# Patient Record
Sex: FEMALE | Race: OTHER | Employment: PART TIME | URBAN - METROPOLITAN AREA
[De-identification: names, ages, dates, MRNs, and addresses within clinical notes are randomized per-mention and may not be internally consistent; named-entity substitution may affect disease eponyms.]

---

## 2022-10-24 ENCOUNTER — HOSPITAL ENCOUNTER (EMERGENCY)
Dept: ULTRASOUND IMAGING | Age: 27
Discharge: HOME | End: 2022-10-27
Payer: COMMERCIAL

## 2022-10-24 ENCOUNTER — HOSPITAL ENCOUNTER (EMERGENCY)
Age: 27
Discharge: HOME | End: 2022-10-24
Attending: EMERGENCY MEDICINE
Payer: COMMERCIAL

## 2022-10-24 VITALS
HEART RATE: 114 BPM | OXYGEN SATURATION: 97 % | TEMPERATURE: 98.5 F | RESPIRATION RATE: 18 BRPM | DIASTOLIC BLOOD PRESSURE: 61 MMHG | SYSTOLIC BLOOD PRESSURE: 105 MMHG

## 2022-10-24 DIAGNOSIS — O20.9 VAGINAL BLEEDING AFFECTING EARLY PREGNANCY: Primary | ICD-10-CM

## 2022-10-24 LAB
ABO + RH BLD: NORMAL
ANION GAP SERPL CALC-SCNC: 9 MMOL/L (ref 2–11)
BUN SERPL-MCNC: 9 MG/DL (ref 6–23)
CALCIUM SERPL-MCNC: 9.8 MG/DL (ref 8.3–10.4)
CHLORIDE SERPL-SCNC: 104 MMOL/L (ref 101–110)
CO2 SERPL-SCNC: 22 MMOL/L (ref 21–32)
CREAT SERPL-MCNC: 0.48 MG/DL (ref 0.6–1)
ERYTHROCYTE [DISTWIDTH] IN BLOOD BY AUTOMATED COUNT: 12.9 % (ref 11.9–14.6)
GLUCOSE SERPL-MCNC: 92 MG/DL (ref 65–100)
HCG SERPL-ACNC: ABNORMAL MIU/ML (ref 0–6)
HCT VFR BLD AUTO: 37.8 % (ref 35.8–46.3)
HGB BLD-MCNC: 13 G/DL (ref 11.7–15.4)
MCH RBC QN AUTO: 29.5 PG (ref 26.1–32.9)
MCHC RBC AUTO-ENTMCNC: 34.4 G/DL (ref 31.4–35)
MCV RBC AUTO: 85.7 FL (ref 82–102)
NRBC # BLD: 0 K/UL (ref 0–0.2)
PLATELET # BLD AUTO: 387 K/UL (ref 150–450)
PMV BLD AUTO: 9.7 FL (ref 9.4–12.3)
POTASSIUM SERPL-SCNC: 3.8 MMOL/L (ref 3.5–5.1)
RBC # BLD AUTO: 4.41 M/UL (ref 4.05–5.2)
SODIUM SERPL-SCNC: 135 MMOL/L (ref 133–143)
WBC # BLD AUTO: 10 K/UL (ref 4.3–11.1)

## 2022-10-24 PROCEDURE — 99284 EMERGENCY DEPT VISIT MOD MDM: CPT

## 2022-10-24 PROCEDURE — 80048 BASIC METABOLIC PNL TOTAL CA: CPT

## 2022-10-24 PROCEDURE — 86901 BLOOD TYPING SEROLOGIC RH(D): CPT

## 2022-10-24 PROCEDURE — 76815 OB US LIMITED FETUS(S): CPT

## 2022-10-24 PROCEDURE — 84702 CHORIONIC GONADOTROPIN TEST: CPT

## 2022-10-24 PROCEDURE — 85027 COMPLETE CBC AUTOMATED: CPT

## 2022-10-24 ASSESSMENT — ENCOUNTER SYMPTOMS
ALLERGIC/IMMUNOLOGIC NEGATIVE: 1
BACK PAIN: 0
NAUSEA: 0
GASTROINTESTINAL NEGATIVE: 1
RESPIRATORY NEGATIVE: 1
EYES NEGATIVE: 1

## 2022-10-24 NOTE — Clinical Note
Deniz Bose was seen and treated in our emergency department on 10/24/2022. She may return to work on 10/31/2022. If you have any questions or concerns, please don't hesitate to call.       MAHSA Bojorquez

## 2022-10-24 NOTE — ED TRIAGE NOTES
[id number]    Pt states she is 11 weeks pregnant and started with vaginal bleeding this afternoon. Pt reports lower abdominal cramping and states she is bleeding heavily.

## 2022-10-25 NOTE — ED PROVIDER NOTES
Emergency Department Provider Note                   PCP:                None Provider               Age: 24 y.o. Sex: female       ICD-10-CM    1. Vaginal bleeding affecting early pregnancy  O20.9           DISPOSITION Decision To Discharge 10/24/2022 08:58:36 PM        MDM     Amount and/or Complexity of Data Reviewed  Clinical lab tests: reviewed  Tests in the radiology section of CPT®: ordered and reviewed    Risk of Complications, Morbidity, and/or Mortality  Presenting problems: moderate  Diagnostic procedures: moderate  Management options: moderate  General comments: Lab work and ultrasound completed, ultrasound does show a single live intrauterine gestation with an estimated age of 5 weeks and 5 days. There was elongated and irregular morphology of the gestational sac which could be of little potential significance or a miscarriage in process. Patient was advised to follow-up with her OB/GYN within 48 to 72 hours for recheck. Take her prenatal multivitamins daily as directed with folic acid. Drink plenty of fluids. Pelvic rest.  Use over-the-counter Tylenol if needed for pain. Return to the ED sooner if worsening in any way. Patient Progress  Patient progress: stable             Orders Placed This Encounter   Procedures    US OB 1 OR MORE FETUS LIMITED    Basic Metabolic Panel    CBC    HCG, Quantitative, Pregnancy    POCT Urine Dipstick    POC Pregnancy Urine Qual    ABO/RH    Saline lock IV        Medications - No data to display    New Prescriptions    No medications on file        Melodie Burns is a 24 y.o. female who presents to the Emergency Department with chief complaint of    Chief Complaint   Patient presents with    Bleeding During Pregnancy      Patient is here with vaginal bleeding and cramping that started this afternoon. She is approximately 11 weeks pregnant. She is here with her . This happened a few weeks ago as well.  No chest pain, shortness of breath, upper abdominal pain, dizziness, weakness, dyspnea exertion, orthopnea, swelling/tingling or weakness to their arms or legs, trouble with urination or bowel movements or other new symptoms. They did ambulate to the room without difficulty and are well hydrated. O+ blood type. The history is provided by the patient. Vaginal Bleeding  Quality:  Bright red  Severity:  Mild  Onset quality:  Gradual  Duration:  4 hours  Timing:  Intermittent  Possible pregnancy: yes    Context: spontaneously    Relieved by:  Nothing  Worsened by:  Nothing  Ineffective treatments:  None tried  Associated symptoms: no back pain, no dizziness, no dyspareunia, no dysuria, no fatigue, no fever, no nausea and no vaginal discharge        Review of Systems   Constitutional: Negative. Negative for fatigue and fever. HENT: Negative. Eyes: Negative. Respiratory: Negative. Cardiovascular: Negative. Gastrointestinal: Negative. Negative for nausea. Endocrine: Negative. Genitourinary:  Positive for pelvic pain and vaginal bleeding. Negative for dyspareunia, dysuria and vaginal discharge. Musculoskeletal: Negative. Negative for back pain. Skin: Negative. Allergic/Immunologic: Negative. Neurological: Negative. Negative for dizziness. Hematological: Negative. Psychiatric/Behavioral: Negative. All other systems reviewed and are negative. History reviewed. No pertinent past medical history. History reviewed. No pertinent surgical history. History reviewed. No pertinent family history. Social History     Socioeconomic History    Marital status:      Spouse name: None    Number of children: None    Years of education: None    Highest education level: None         Patient has no known allergies. Previous Medications    No medications on file        Vitals signs and nursing note reviewed. No data found. Physical Exam  Vitals and nursing note reviewed.    Constitutional: Appearance: Normal appearance. HENT:      Head: Normocephalic and atraumatic. Right Ear: External ear normal.      Left Ear: External ear normal.      Nose: Nose normal.      Mouth/Throat:      Mouth: Mucous membranes are moist.   Eyes:      Extraocular Movements: Extraocular movements intact. Conjunctiva/sclera: Conjunctivae normal.      Pupils: Pupils are equal, round, and reactive to light. Cardiovascular:      Rate and Rhythm: Normal rate. Pulses: Normal pulses. Pulmonary:      Effort: Pulmonary effort is normal.   Abdominal:      General: Abdomen is flat. Palpations: Abdomen is soft. Musculoskeletal:         General: Normal range of motion. Cervical back: Normal range of motion. Skin:     General: Skin is warm. Capillary Refill: Capillary refill takes less than 2 seconds. Neurological:      General: No focal deficit present. Mental Status: She is alert and oriented to person, place, and time. Mental status is at baseline. Psychiatric:         Mood and Affect: Mood normal.         Behavior: Behavior normal.         Thought Content: Thought content normal.         Judgment: Judgment normal.        Procedures    Results for orders placed or performed during the hospital encounter of 10/24/22   US OB 1 OR MORE FETUS LIMITED    Narrative    EXAM: US OB 1 OR MORE FETUS LIMITED  INDICATION: Pregnant, vaginal bleeding  COMPARISON: None  TECHNIQUE:  Realtime sonography of the pelvis was performed transabdominally and  transvaginally with multiple static images obtained. FINDINGS:  The uterus measures 13.5 x 7.7 x 7.0 cm. There is a single intrauterine  gestation sac with elongated, irregular morphology with a mean sac diameter of  6.2 cm a fetal pole is identified with a crown-rump length of 4.9 cm  corresponding to a gestational age of 11 weeks 5 days. Fetal cardiac activity is  identified at 164 bpm. The cervix appears closed. No perigestational hemorrhage.     The right ovary measures 4.3 x 2.2 x 3.0 cm. No adnexal mass. Normal Doppler and  spectral flow. The left ovary was not visualized. Trace free fluid in the pelvis. Impression    1. Single live intrauterine gestation with estimated gestational age of 7  weeks, 5 days. 2. Elongated and irregular morphology of the gestational sac is potentially of  little significance, though a miscarriage in progress could have this  appearance. Consider short-term follow-up ultrasound. Basic Metabolic Panel   Result Value Ref Range    Sodium 135 133 - 143 mmol/L    Potassium 3.8 3.5 - 5.1 mmol/L    Chloride 104 101 - 110 mmol/L    CO2 22 21 - 32 mmol/L    Anion Gap 9 2 - 11 mmol/L    Glucose 92 65 - 100 mg/dL    BUN 9 6 - 23 MG/DL    Creatinine 0.48 (L) 0.6 - 1.0 MG/DL    Est, Glom Filt Rate >60 >60 ml/min/1.73m2    Calcium 9.8 8.3 - 10.4 MG/DL   CBC   Result Value Ref Range    WBC 10.0 4.3 - 11.1 K/uL    RBC 4.41 4.05 - 5.2 M/uL    Hemoglobin 13.0 11.7 - 15.4 g/dL    Hematocrit 37.8 35.8 - 46.3 %    MCV 85.7 82.0 - 102.0 FL    MCH 29.5 26.1 - 32.9 PG    MCHC 34.4 31.4 - 35.0 g/dL    RDW 12.9 11.9 - 14.6 %    Platelets 001 295 - 651 K/uL    MPV 9.7 9.4 - 12.3 FL    nRBC 0.00 0.0 - 0.2 K/uL   HCG, Quantitative, Pregnancy   Result Value Ref Range    hCG Quant 68,118 (H) 0.0 - 6.0 MIU/ML   ABO/RH   Result Value Ref Range    ABO/Rh O POSITIVE         US OB 1 OR MORE FETUS LIMITED   Final Result      1. Single live intrauterine gestation with estimated gestational age of 7   weeks, 5 days. 2. Elongated and irregular morphology of the gestational sac is potentially of   little significance, though a miscarriage in progress could have this   appearance. Consider short-term follow-up ultrasound. Voice dictation software was used during the making of this note. This software is not perfect and grammatical and other typographical errors may be present.   This note has not been completely proofread for errors.      MAHSA Estes  10/24/22 2102

## 2022-10-25 NOTE — DISCHARGE INSTRUCTIONS
Call your OB tomorrow for follow-up. You must be seen within 48 to 72 hours for recheck. Return to the ED sooner if worsening in any way. Pelvic rest, drink plenty of fluids and may use Tylenol if needed for cramping or pain.

## 2022-10-25 NOTE — ED NOTES
I have reviewed discharge instructions with the patient. The patient verbalized understanding. Patient left ED via Discharge Method: ambulatory to [place] [id number] for questions and clarification provided. Patient given 0 scripts. To continue your aftercare when you leave the hospital, you may receive an automated call from our care team to check in on how you are doing. This is a free service and part of our promise to provide the best care and service to meet your aftercare needs.  If you have questions, or wish to unsubscribe from this service please call [phone]. Thank you for Choosing our [address] [place] [address] Emergency Department.       Harjit Arana RN  10/24/22 1874